# Patient Record
Sex: MALE | Race: WHITE
[De-identification: names, ages, dates, MRNs, and addresses within clinical notes are randomized per-mention and may not be internally consistent; named-entity substitution may affect disease eponyms.]

---

## 2020-12-20 ENCOUNTER — HOSPITAL ENCOUNTER (INPATIENT)
Dept: HOSPITAL 7 - FB.ED | Age: 70
LOS: 3 days | Discharge: HOME | DRG: 195 | End: 2020-12-23
Attending: FAMILY MEDICINE | Admitting: FAMILY MEDICINE
Payer: OTHER GOVERNMENT

## 2020-12-20 DIAGNOSIS — Z79.899: ICD-10-CM

## 2020-12-20 DIAGNOSIS — Z87.891: ICD-10-CM

## 2020-12-20 DIAGNOSIS — Z91.048: ICD-10-CM

## 2020-12-20 DIAGNOSIS — R10.13: ICD-10-CM

## 2020-12-20 DIAGNOSIS — J18.9: Primary | ICD-10-CM

## 2020-12-20 DIAGNOSIS — N28.9: ICD-10-CM

## 2020-12-20 DIAGNOSIS — Z20.828: ICD-10-CM

## 2020-12-20 DIAGNOSIS — R79.82: ICD-10-CM

## 2020-12-20 DIAGNOSIS — E86.0: ICD-10-CM

## 2020-12-20 DIAGNOSIS — E87.6: ICD-10-CM

## 2020-12-20 DIAGNOSIS — F10.10: ICD-10-CM

## 2020-12-20 DIAGNOSIS — K58.0: ICD-10-CM

## 2020-12-20 DIAGNOSIS — R51.9: ICD-10-CM

## 2020-12-20 DIAGNOSIS — E88.09: ICD-10-CM

## 2020-12-20 DIAGNOSIS — N17.9: ICD-10-CM

## 2020-12-20 DIAGNOSIS — Z91.09: ICD-10-CM

## 2020-12-20 DIAGNOSIS — I10: ICD-10-CM

## 2020-12-20 PROCEDURE — 99285 EMERGENCY DEPT VISIT HI MDM: CPT

## 2020-12-20 PROCEDURE — C9113 INJ PANTOPRAZOLE SODIUM, VIA: HCPCS

## 2020-12-20 PROCEDURE — U0002 COVID-19 LAB TEST NON-CDC: HCPCS

## 2020-12-20 PROCEDURE — 83605 ASSAY OF LACTIC ACID: CPT

## 2020-12-20 PROCEDURE — 96374 THER/PROPH/DIAG INJ IV PUSH: CPT

## 2020-12-20 PROCEDURE — 83735 ASSAY OF MAGNESIUM: CPT

## 2020-12-20 PROCEDURE — 96375 TX/PRO/DX INJ NEW DRUG ADDON: CPT

## 2020-12-20 PROCEDURE — 36415 COLL VENOUS BLD VENIPUNCTURE: CPT

## 2020-12-20 PROCEDURE — 87635 SARS-COV-2 COVID-19 AMP PRB: CPT

## 2020-12-20 PROCEDURE — 81001 URINALYSIS AUTO W/SCOPE: CPT

## 2020-12-20 PROCEDURE — 74177 CT ABD & PELVIS W/CONTRAST: CPT

## 2020-12-20 PROCEDURE — 84484 ASSAY OF TROPONIN QUANT: CPT

## 2020-12-20 PROCEDURE — 93005 ELECTROCARDIOGRAM TRACING: CPT

## 2020-12-20 PROCEDURE — 71045 X-RAY EXAM CHEST 1 VIEW: CPT

## 2020-12-20 PROCEDURE — 85025 COMPLETE CBC W/AUTO DIFF WBC: CPT

## 2020-12-20 PROCEDURE — 80053 COMPREHEN METABOLIC PANEL: CPT

## 2020-12-20 PROCEDURE — 86140 C-REACTIVE PROTEIN: CPT

## 2020-12-20 PROCEDURE — 83690 ASSAY OF LIPASE: CPT

## 2020-12-20 RX ADMIN — DEXTROSE MONOHYDRATE, SODIUM CHLORIDE, AND POTASSIUM CHLORIDE SCH MLS/HR: 50; 4.5; 1.49 INJECTION, SOLUTION INTRAVENOUS at 22:14

## 2020-12-20 RX ADMIN — VITAMIN D, TAB 1000IU (100/BT) SCH MCG: 25 TAB at 10:02

## 2020-12-20 RX ADMIN — POTASSIUM CHLORIDE SCH MEQ: 1500 TABLET, EXTENDED RELEASE ORAL at 14:53

## 2020-12-20 RX ADMIN — POTASSIUM CHLORIDE SCH MEQ: 1500 TABLET, EXTENDED RELEASE ORAL at 10:03

## 2020-12-20 RX ADMIN — DEXTROSE MONOHYDRATE, SODIUM CHLORIDE, AND POTASSIUM CHLORIDE SCH MLS/HR: 50; 4.5; 1.49 INJECTION, SOLUTION INTRAVENOUS at 07:47

## 2020-12-20 RX ADMIN — POTASSIUM CHLORIDE SCH MEQ: 1500 TABLET, EXTENDED RELEASE ORAL at 06:56

## 2020-12-20 RX ADMIN — LEVOFLOXACIN ONE MLS/HR: 750 INJECTION, SOLUTION INTRAVENOUS at 05:53

## 2020-12-20 RX ADMIN — LEVOFLOXACIN ONE MLS/HR: 750 INJECTION, SOLUTION INTRAVENOUS at 05:57

## 2020-12-20 NOTE — PCM.HP.2
H&P History of Present Illness





- General


Date of Service: 12/20/20


Admit Problem/Dx: 


                           Admission Diagnosis/Problem





Admission Diagnosis/Problem      Pneumonia








Source of Information: Patient


History Limitations: Reports: No Limitations





- History of Present Illness


Initial Comments - Free Text/Narative: 


Norberto is a 70-year-old male who came in because of epigastric pain for a week. 

Moderate to severe burning pain that does not seem to improve within. Associated

with loose stools but no vomiting. He has a history of PUD, with an upper and 

lower GI endoscopy 5 years ago. He also admits to alcohol abuse.





- Related Data


Allergies/Adverse Reactions: 


                                    Allergies











Allergy/AdvReac Type Severity Reaction Status Date / Time


 


plastics Allergy  Rash Uncoded 12/20/20 03:14











Home Medications: 


                                    Home Meds





Cholecalciferol (Vitamin D3) [Vitamin D3] 50 mcg PO DAILY 12/20/20 [History]


Gabapentin [Neurontin] 400 mg PO BID 12/20/20 [History]


Losartan [Cozaar] 50 mg PO DAILY 12/20/20 [History]


Naproxen 500 mg PO BID PRN 12/20/20 [History]


Pantoprazole Sodium [Protonix] 40 mg PO DAILY 12/20/20 [History]











Past Medical History


Cardiovascular History: Reports: Hypertension


Gastrointestinal History: Reports: Irritable Bowel Syndrome, Other (See Below)


Other Gastrointestinal History: History of ulcers.


Psychiatric History: Reports: Other (See Below)


Other Psychiatric History: Alcoholic





Social & Family History





- Tobacco Use


Tobacco Use Status *Q: Never Tobacco User


Second Hand Smoke Exposure: No





- Caffeine Use


Caffeine Use: Reports: Coffee


Other Caffeine Use: coffee 2x a week





- Alcohol Use


Days Per Week of Alcohol Use: 2


Number of Drinks Per Day: 1


Total Drinks Per Week: 2


Date of Last Drink: 12/19/20





- Recreational Drug Use


Recreational Drug Use: No





H&P Review of Systems





- Review of Systems:


Review Of Systems: Comprehensive ROS is negative, except as noted in HPI.





Exam





- Exam


Exam: See Below





- Vital Signs


Vital Signs: 


                                Last Vital Signs











Temp  100.1 F   12/20/20 06:07


 


Pulse  65   12/20/20 06:07


 


Resp  18   12/20/20 06:07


 


BP  115/67   12/20/20 06:07


 


Pulse Ox  94 L  12/20/20 06:07











Weight: 87.77 kg





- Exam


Quality Assessment: No: Supplemental Oxygen


General: Alert, Oriented


HEENT: PERRLA


Neck: Supple


Lungs: Clear to Auscultation, Crackles


Cardiovascular: Regular Rate


GI/Abdominal Exam: Normal Bowel Sounds, Soft


 (Male) Exam: Deferred


Rectal (Males) Exam: Deferred


Back Exam: Normal Inspection


Extremities: Normal Inspection


Skin: Warm


Neurological: Cranial Nerves Intact


Neuro Extensive - Mental Status: Alert


Neuro Extensive - Motor, Sensory, Reflexes: CN II-XII Intact


Psychiatric: Alert, Normal Affect





- Patient Data


Lab Results Last 24 hrs: 


                         Laboratory Results - last 24 hr











  12/20/20 12/20/20 12/20/20 Range/Units





  01:55 01:55 01:55 


 


WBC  10.0    (3.2-10.1)  x10-3/uL


 


RBC  5.91 H    (3.90-5.90)  x10(6)uL


 


Hgb  16.9    (12.9-17.7)  g/dL


 


Hct  50.5 H    (38.3-50.1)  %


 


MCV  85.6    (80.8-98.7)  fL


 


MCH  28.6    (27.0-33.3)  pg


 


MCHC  33.4    (28.7-35.3)  g/dL


 


RDW  13.1    (12.4-15.0)  %


 


Plt Count  195    (117-477)  x10(3)uL


 


MPV  8.6    (6.7-11.0)  fL


 


Add Manual Diff  Yes    


 


Neutrophils % (Manual)  76    (46-82)  %


 


Band Neutrophils %  7 H    (0-6)  %


 


Lymphocytes % (Manual)  11 L    (13-37)  %


 


Monocytes % (Manual)  6    (4-12)  %


 


Sodium   139   (135-145)  mmol/L


 


Potassium   2.9 L   (3.5-5.3)  mmol/L


 


Chloride   101   (100-110)  mmol/L


 


Carbon Dioxide   19 L   (21-32)  mmol/L


 


BUN   30 H   (7-18)  mg/dL


 


Creatinine   1.5 H   (0.70-1.30)  mg/dL


 


Est Cr Clr Drug Dosing   TNP   


 


Estimated GFR (MDRD)   46 L   (>60)  


 


BUN/Creatinine Ratio   20.0   (9-20)  


 


Glucose   140 H   ()  mg/dL


 


Lactic Acid     (0.4-2.0)  mmol/L


 


Calcium   9.4   (8.6-10.2)  mg/dL


 


Magnesium   2.0   (1.8-2.5)  mg/dL


 


Total Bilirubin   0.8   (0.1-1.3)  mg/dL


 


AST   31 H   (5-25)  IU/L


 


ALT   19   (12-36)  U/L


 


Alkaline Phosphatase   61   ()  IU/L


 


Troponin I    6.8  (4.0-60.3)  pg/mL


 


C-Reactive Protein    14.4 H*  (0.5-0.9)  mg/dL


 


Total Protein   7.9   (6.0-8.0)  g/dL


 


Albumin   3.1 L   (3.2-4.6)  g/dL


 


Globulin   4.8   g/dL


 


Albumin/Globulin Ratio   0.7   


 


Lipase     ()  U/L


 


Urine Color     (YELLOW)  


 


Urine Appearance     (CLEAR)  


 


Urine pH     (5.0-6.5)  


 


Ur Specific Gravity     (1.010-1.025)  


 


Urine Protein     (NEGATIVE)  mg/dL


 


Urine Glucose (UA)     (NORMAL)  mg/dL


 


Urine Ketones     (NEGATIVE)  mg/dL


 


Urine Occult Blood     (NEGATIVE)  


 


Urine Nitrite     (NEGATIVE)  


 


Urine Bilirubin     (NEGATIVE)  


 


Urine Urobilinogen     (NEGATIVE)  mg/dL


 


Ur Leukocyte Esterase     (NEGATIVE)  


 


Urine RBC     (0-5)  


 


Urine WBC     (0-5)  


 


Ur Squamous Epith Cells     (NS,R,O)  


 


Urine Bacteria     (NS)  


 


Coarse Granular Casts     (NS)  


 


SARS-CoV-2 RNA ([facility]O)     (NEGATIVE)  














  12/20/20 12/20/20 12/20/20 Range/Units





  01:55 01:55 03:20 


 


WBC     (3.2-10.1)  x10-3/uL


 


RBC     (3.90-5.90)  x10(6)uL


 


Hgb     (12.9-17.7)  g/dL


 


Hct     (38.3-50.1)  %


 


MCV     (80.8-98.7)  fL


 


MCH     (27.0-33.3)  pg


 


MCHC     (28.7-35.3)  g/dL


 


RDW     (12.4-15.0)  %


 


Plt Count     (117-477)  x10(3)uL


 


MPV     (6.7-11.0)  fL


 


Add Manual Diff     


 


Neutrophils % (Manual)     (46-82)  %


 


Band Neutrophils %     (0-6)  %


 


Lymphocytes % (Manual)     (13-37)  %


 


Monocytes % (Manual)     (4-12)  %


 


Sodium     (135-145)  mmol/L


 


Potassium     (3.5-5.3)  mmol/L


 


Chloride     (100-110)  mmol/L


 


Carbon Dioxide     (21-32)  mmol/L


 


BUN     (7-18)  mg/dL


 


Creatinine     (0.70-1.30)  mg/dL


 


Est Cr Clr Drug Dosing     


 


Estimated GFR (MDRD)     (>60)  


 


BUN/Creatinine Ratio     (9-20)  


 


Glucose     ()  mg/dL


 


Lactic Acid  1.7    (0.4-2.0)  mmol/L


 


Calcium     (8.6-10.2)  mg/dL


 


Magnesium     (1.8-2.5)  mg/dL


 


Total Bilirubin     (0.1-1.3)  mg/dL


 


AST     (5-25)  IU/L


 


ALT     (12-36)  U/L


 


Alkaline Phosphatase     ()  IU/L


 


Troponin I     (4.0-60.3)  pg/mL


 


C-Reactive Protein     (0.5-0.9)  mg/dL


 


Total Protein     (6.0-8.0)  g/dL


 


Albumin     (3.2-4.6)  g/dL


 


Globulin     g/dL


 


Albumin/Globulin Ratio     


 


Lipase   72 L   ()  U/L


 


Urine Color     (YELLOW)  


 


Urine Appearance     (CLEAR)  


 


Urine pH     (5.0-6.5)  


 


Ur Specific Gravity     (1.010-1.025)  


 


Urine Protein     (NEGATIVE)  mg/dL


 


Urine Glucose (UA)     (NORMAL)  mg/dL


 


Urine Ketones     (NEGATIVE)  mg/dL


 


Urine Occult Blood     (NEGATIVE)  


 


Urine Nitrite     (NEGATIVE)  


 


Urine Bilirubin     (NEGATIVE)  


 


Urine Urobilinogen     (NEGATIVE)  mg/dL


 


Ur Leukocyte Esterase     (NEGATIVE)  


 


Urine RBC     (0-5)  


 


Urine WBC     (0-5)  


 


Ur Squamous Epith Cells     (NS,R,O)  


 


Urine Bacteria     (NS)  


 


Coarse Granular Casts     (NS)  


 


SARS-CoV-2 RNA ([place]O)    Negative  (NEGATIVE)  














  12/20/20 Range/Units





  04:50 


 


WBC   (3.2-10.1)  x10-3/uL


 


RBC   (3.90-5.90)  x10(6)uL


 


Hgb   (12.9-17.7)  g/dL


 


Hct   (38.3-50.1)  %


 


MCV   (80.8-98.7)  fL


 


MCH   (27.0-33.3)  pg


 


MCHC   (28.7-35.3)  g/dL


 


RDW   (12.4-15.0)  %


 


Plt Count   (117-477)  x10(3)uL


 


MPV   (6.7-11.0)  fL


 


Add Manual Diff   


 


Neutrophils % (Manual)   (46-82)  %


 


Band Neutrophils %   (0-6)  %


 


Lymphocytes % (Manual)   (13-37)  %


 


Monocytes % (Manual)   (4-12)  %


 


Sodium   (135-145)  mmol/L


 


Potassium   (3.5-5.3)  mmol/L


 


Chloride   (100-110)  mmol/L


 


Carbon Dioxide   (21-32)  mmol/L


 


BUN   (7-18)  mg/dL


 


Creatinine   (0.70-1.30)  mg/dL


 


Est Cr Clr Drug Dosing   


 


Estimated GFR (MDRD)   (>60)  


 


BUN/Creatinine Ratio   (9-20)  


 


Glucose   ()  mg/dL


 


Lactic Acid   (0.4-2.0)  mmol/L


 


Calcium   (8.6-10.2)  mg/dL


 


Magnesium   (1.8-2.5)  mg/dL


 


Total Bilirubin   (0.1-1.3)  mg/dL


 


AST   (5-25)  IU/L


 


ALT   (12-36)  U/L


 


Alkaline Phosphatase   ()  IU/L


 


Troponin I   (4.0-60.3)  pg/mL


 


C-Reactive Protein   (0.5-0.9)  mg/dL


 


Total Protein   (6.0-8.0)  g/dL


 


Albumin   (3.2-4.6)  g/dL


 


Globulin   g/dL


 


Albumin/Globulin Ratio   


 


Lipase   ()  U/L


 


Urine Color  Yellow  (YELLOW)  


 


Urine Appearance  Slightly cloudy  (CLEAR)  


 


Urine pH  6.0  (5.0-6.5)  


 


Ur Specific Gravity  1.020  (1.010-1.025)  


 


Urine Protein  Trace  (NEGATIVE)  mg/dL


 


Urine Glucose (UA)  Normal  (NORMAL)  mg/dL


 


Urine Ketones  Negative  (NEGATIVE)  mg/dL


 


Urine Occult Blood  Moderate H  (NEGATIVE)  


 


Urine Nitrite  Negative  (NEGATIVE)  


 


Urine Bilirubin  Negative  (NEGATIVE)  


 


Urine Urobilinogen  Normal  (NEGATIVE)  mg/dL


 


Ur Leukocyte Esterase  Negative  (NEGATIVE)  


 


Urine RBC  5-10 H  (0-5)  


 


Urine WBC  0-5  (0-5)  


 


Ur Squamous Epith Cells  Occasional  (NS,R,O)  


 


Urine Bacteria  Few H  (NS)  


 


Coarse Granular Casts  Rare H  (NS)  


 


SARS-CoV-2 RNA ([facility]INCITO)   (NEGATIVE)  











Result Diagrams: 


                                 12/20/20 01:55





                                 12/20/20 01:55


  ** #1 Interpretation


Rhythm: NSR





Sepsis Event Note





- Evaluation


Sepsis Screening Result: No Definite Risk





- Focused Exam


Vital Signs: 


                                   Vital Signs











  Temp Pulse Resp BP Pulse Ox


 


 12/20/20 06:07  100.1 F  65  18  115/67  94 L


 


 12/20/20 05:35  100 F  63  18  150/88 H  95


 


 12/20/20 01:10  99.2 F  75  18  155/97 H  96














- Problem List


(1) Pneumonia


SNOMED Code(s): 217899217


   ICD Code: J18.9 - PNEUMONIA, UNSPECIFIED ORGANISM   Status: Acute   Current 

Visit: Yes   


Qualifiers: 


   Lung location: unspecified part of lung 





(2) Hypokalemia


SNOMED Code(s): 80793610


   ICD Code: E87.6 - HYPOKALEMIA   Status: Acute   Current Visit: Yes   





(3) Epigastric pain determined by examination


SNOMED Code(s): 064916038


   ICD Code: R10.13 - EPIGASTRIC PAIN   Status: Acute   Current Visit: Yes   





(4) Acute renal insufficiency


SNOMED Code(s): 195284758


   ICD Code: N28.9 - DISORDER OF KIDNEY AND URETER, UNSPECIFIED   Status: Acute 

  Current Visit: Yes   





(5) IBS (irritable bowel syndrome)


SNOMED Code(s): 04600169


   ICD Code: K58.9 - IRRITABLE BOWEL SYNDROME WITHOUT DIARRHEA   Status: Acute  

 Current Visit: Yes   


Qualifiers: 


   Irritable bowel syndrome type: with diarrhea   Qualified Code(s): K58.0 - 

Irritable bowel syndrome with diarrhea   


Problem List Initiated/Reviewed/Updated: Yes


Orders Last 24hrs: 


                               Active Orders 24 hr











 Category Date Time Status


 


 Admission Status [Patient Status] [ADT] Routine ADT  12/20/20 05:28 Active


 


 Cardiac Monitoring [RC] CONTINUOUS Care  12/20/20 05:47 Active


 


 EKG Documentation Completion [RC] ASDIRECTED Care  12/20/20 01:30 Active


 


 Oxygen Therapy [RC] PRN Care  12/20/20 05:47 Active


 


 Up ad Delilah [RC] ASDIRECTED Care  12/20/20 05:47 Active


 


 VTE/DVT Education [RC] Per Unit Routine Care  12/20/20 05:47 Active


 


 Vital Signs [RC] Q4H Care  12/20/20 05:47 Active


 


 Regular Diet [DIET] Diet  12/20/20 Breakfast Active


 


 Abdomen Pelvis w Cont [CT] Stat Exams  12/20/20 01:28 Taken


 


 Chest 1V Frontal [CR] Stat Exams  12/20/20 01:28 Taken


 


 Chest 2V [CR] Timed Exams  12/22/20 08:00 Ordered


 


 CBC WITH AUTO DIFF [HEME] DAILY Lab  12/21/20 06:00 Ordered


 


 CBC WITH AUTO DIFF [HEME] DAILY Lab  12/22/20 06:00 Ordered


 


 CBC WITH AUTO DIFF [HEME] DAILY Lab  12/23/20 06:00 Ordered


 


 CBC WITH AUTO DIFF [HEME] DAILY Lab  12/24/20 06:00 Ordered


 


 CBC WITH AUTO DIFF [HEME] DAILY Lab  12/25/20 06:00 Ordered


 


 COMPREHENSIVE METABOLIC PN,CMP [CHEM] DAILY Lab  12/21/20 06:00 Ordered


 


 COMPREHENSIVE METABOLIC PN,CMP [CHEM] DAILY Lab  12/22/20 06:00 Ordered


 


 COMPREHENSIVE METABOLIC PN,CMP [CHEM] DAILY Lab  12/23/20 06:00 Ordered


 


 COMPREHENSIVE METABOLIC PN,CMP [CHEM] DAILY Lab  12/24/20 06:00 Ordered


 


 COMPREHENSIVE METABOLIC PN,CMP [CHEM] DAILY Lab  12/25/20 06:00 Ordered


 


 CORONAVIRUS COVID-19 [place]CITO [MOLEC] Routine Lab  12/21/20 06:00 Ordered


 


 CULTURE BLOOD [BC] Urgent Lab  12/20/20 05:50 Received


 


 CULTURE BLOOD [BC] Urgent Lab  12/20/20 05:55 Received


 


 Cholecalciferol (Vitamin D3) [Vitamin D3] Med  12/20/20 09:00 Active





 50 mcg PO DAILY   


 


 D5 1/2 NS w/ 20 mEq/L KCl 1,000 ml Med  12/20/20 06:00 Active





 IV ASDIRECTED   


 


 Enoxaparin [Lovenox] Med  12/20/20 06:00 Active





 40 mg SUBCUT Q24H   


 


 Gabapentin [Neurontin] Med  12/20/20 09:00 Active





 400 mg PO BID   


 


 Levofloxacin/Dextrose 5%-Water [Levaquin in D5W 750 MG/ Med  12/20/20 09:15 

Ordered





 150 ML] 750 mg   





 Premix Bag 1 bag   





 IV Q24H   


 


 Losartan [Cozaar] Med  12/20/20 09:00 Active





 50 mg PO DAILY   


 


 Ondansetron [Zofran] Med  12/20/20 05:47 Active





 4 mg IV Q4H PRN   


 


 Pantoprazole [ProTONIX***] Med  12/20/20 09:00 Active





 40 mg PO DAILY   


 


 Potassium Chloride [Klor-Con M20] Med  12/20/20 06:00 Active





 40 meq PO Q4H   


 


 Sodium Chloride 0.9% [Normal Saline] 1,000 ml Med  12/20/20 03:00 Active





 IV ASDIRECTED   


 


 Zolpidem [Ambien] Med  12/20/20 05:47 Active





 5 mg PO BEDTIME PRN   


 


 Blood Culture x2 Reflex Set [OM.PC] Urgent Oth  12/20/20 05:28 Ordered


 


 Resuscitation Status Routine Resus Stat  12/20/20 05:47 Ordered


 


 EKG 12 Lead [EK] Routine Ther  12/20/20 01:28 Ordered








                                Medication Orders





Cholecalciferol (Vitamin D3)  50 mcg PO DAILY [facility]


Enoxaparin Sodium (Lovenox)  40 mg SUBCUT Q24H [facility]


   Last Admin: 12/20/20 07:00  Dose: 40 mg


   Documented by: JOE


Gabapentin (Neurontin)  400 mg PO BID [facility]


Sodium Chloride (Normal Saline)  1,000 mls @ 999 mls/hr IV ASDIRECTED [facility]


   Last Admin: 12/20/20 02:49  Dose: 999 mls/hr


   Documented by: HARRY


Potassium Chloride/Dextrose/Sod Cl (D5 1/2 Ns W/ 20 Meq/L Kcl)  1,000 mls @ 75 

mls/hr IV ASDIRECTED [facility]


   Last Admin: 12/20/20 07:47  Dose: 75 mls/hr


   Documented by: REEMA


Levofloxacin/Dextrose 750 mg/ (Premix)  150 mls @ 100 mls/hr IV Q24H [facility]


Losartan Potassium (Cozaar)  50 mg PO DAILY [facility]


Ondansetron HCl (Zofran)  4 mg IV Q4H PRN


   PRN Reason: Nausea/Vomiting


Pantoprazole Sodium (Protonix***)  40 mg PO DAILY [facility]


Potassium Chloride (Klor-Con M20)  40 meq PO Q4H [facility]


   Stop: 12/20/20 14:01


   Last Admin: 12/20/20 06:56  Dose: 40 meq


   Documented by: JOE


Zolpidem Tartrate (Ambien)  5 mg PO BEDTIME PRN


   PRN Reason: Sleep








Assessment/Plan Comment:: 


Admit for IV fluid supplantation,IV antibiotics. Replace K. Repeat Labs. CIWA 

protocol to be initiated. PPI for abdominalgia.

## 2020-12-20 NOTE — EDM.PDOC
ED HPI GENERAL MEDICAL PROBLEM





- General


Chief Complaint: General


Stated Complaint: cough, nausea


Time Seen by Provider: 12/20/20 01:15


Source of Information: Reports: Patient


History Limitations: Reports: No Limitations





- History of Present Illness


INITIAL COMMENTS - FREE TEXT/NARRATIVE: 





c/o epigastric pain x 1w





pt drinks alcohol daily, beer, including yesterday





has had pain in his epigastrium x 1w, has h/o PUD ~5y ago with upper and lower 

endoscopy





no prior abd surgery





has had slight cough x 1w, no fever





has not eaten in 2d





has IBS with freq loose BMs





here with wife who provided most of hx





- Related Data


                                    Allergies











Allergy/AdvReac Type Severity Reaction Status Date / Time


 


plastics Allergy  Rash Uncoded 12/20/20 03:14











Home Meds: 


                                    Home Meds





Cholecalciferol (Vitamin D3) [Vitamin D3] 50 mcg PO DAILY 12/20/20 [History]


Gabapentin [Neurontin] 400 mg PO BID 12/20/20 [History]


Losartan [Cozaar] 50 mg PO DAILY 12/20/20 [History]


Naproxen 500 mg PO BID PRN 12/20/20 [History]


Pantoprazole Sodium [Protonix] 40 mg PO DAILY 12/20/20 [History]











ED ROS GENERAL





- Review of Systems


Review Of Systems: See Below


Constitutional: Reports: No Symptoms


HEENT: Reports: No Symptoms


Respiratory: Reports: Shortness of Breath, Cough


Cardiovascular: Reports: No Symptoms


Endocrine: Reports: No Symptoms


GI/Abdominal: Reports: Abdominal Pain, Diarrhea, Nausea, Vomiting


: Reports: No Symptoms


Musculoskeletal: Reports: No Symptoms


Skin: Reports: No Symptoms


Neurological: Reports: No Symptoms


Psychiatric: Reports: No Symptoms


Hematologic/Lymphatic: Reports: No Symptoms


Immunologic: Reports: No Symptoms





ED EXAM, GENERAL





- Physical Exam


Exam: See Below


General Appearance: Alert, WD/WN, Mild Distress, Other (lying covered with a 

blanket, did not want to answer questions (which his wife did))


Ears: Normal External Exam, Hearing Grossly Normal


Nose: Normal Inspection


Head: Atraumatic, Normocephalic


Neck: Normal Inspection, Supple, Non-Tender, Full Range of Motion.  No: 

Lymphadenopathy (R), Lymphadenopathy (L)


Respiratory/Chest: Lungs Clear, Normal Breath Sounds, No Accessory Muscle Use


Cardiovascular: Regular Rate, Rhythm, No Edema, No Gallop, Other (2/6 [place] at 

LSB)


GI/Abdominal: Soft, No Distention, Other (1+ epigastric and LUQ tender, possible

 tender at L flank, no guard/rebound).  No: Distended, Guarding, Rigid, Rebound


Back Exam: Normal Inspection, Full Range of Motion.  No: CVA Tenderness (R), CVA

 Tenderness (L)


Extremities: Normal Range of Motion, Other (moderate dec'd turgor UEs, no pretib

 edema)


Neurological: Alert, Oriented, CN II-XII Intact, No Motor/Sensory Deficits


Psychiatric: Anxious


Skin Exam: Warm, Dry, Intact, Normal Color, No Rash


Lymphatic: No Adenopathy





Course





- Vital Signs


Last Recorded V/S: 


                                Last Vital Signs











Temp  37.3 C   12/20/20 01:10


 


Pulse  75   12/20/20 01:10


 


Resp  18   12/20/20 01:10


 


BP  155/97 H  12/20/20 01:10


 


Pulse Ox  96   12/20/20 01:10














- Orders/Labs/Meds


Orders: 


                               Active Orders 24 hr











 Category Date Time Status


 


 Admission Status [Patient Status] [ADT] Routine ADT  12/20/20 05:28 Ordered


 


 EKG Documentation Completion [RC] ASDIRECTED Care  12/20/20 01:30 Active


 


 Abdomen Pelvis w Cont [CT] Stat Exams  12/20/20 01:28 Taken


 


 Chest 1V Frontal [CR] Stat Exams  12/20/20 01:28 Taken


 


 CULTURE BLOOD [BC] Urgent Lab  12/20/20 05:28 Ordered


 


 CULTURE BLOOD [BC] Urgent Lab  12/20/20 05:28 Ordered


 


 Levofloxacin/Dextrose 5%-Water [Levaquin in D5W 750 MG/ Med  12/20/20 05:31 

Ordered





 150 ML] 750 mg   





 Premix Bag 1 bag   





 IV ONETIME   


 


 Sodium Chloride 0.9% [Normal Saline] 1,000 ml Med  12/20/20 03:00 Active





 IV ASDIRECTED   


 


 Blood Culture x2 Reflex Set [OM.PC] Urgent Oth  12/20/20 05:28 Ordered


 


 EKG 12 Lead [EK] Routine Ther  12/20/20 01:28 Ordered








                                Medication Orders





Sodium Chloride (Normal Saline)  1,000 mls @ 999 mls/hr IV ASDIRECTED MARIEL


   Last Admin: 12/20/20 02:49  Dose: 999 mls/hr


   Documented by: BRENLOR


Levofloxacin/Dextrose 750 mg/ (Premix)  150 mls @ 100 mls/hr IV ONETIME ONE


   Stop: 12/20/20 07:00








Labs: 


                                Laboratory Tests











  12/20/20 12/20/20 12/20/20 Range/Units





  01:55 01:55 01:55 


 


WBC  10.0    (3.2-10.1)  x10-3/uL


 


RBC  5.91 H    (3.90-5.90)  x10(6)uL


 


Hgb  16.9    (12.9-17.7)  g/dL


 


Hct  50.5 H    (38.3-50.1)  %


 


MCV  85.6    (80.8-98.7)  fL


 


MCH  28.6    (27.0-33.3)  pg


 


MCHC  33.4    (28.7-35.3)  g/dL


 


RDW  13.1    (12.4-15.0)  %


 


Plt Count  195    (117-477)  x10(3)uL


 


MPV  8.6    (6.7-11.0)  fL


 


Add Manual Diff  Yes    


 


Neutrophils % (Manual)  76    (46-82)  %


 


Band Neutrophils %  7 H    (0-6)  %


 


Lymphocytes % (Manual)  11 L    (13-37)  %


 


Monocytes % (Manual)  6    (4-12)  %


 


Sodium   139   (135-145)  mmol/L


 


Potassium   2.9 L   (3.5-5.3)  mmol/L


 


Chloride   101   (100-110)  mmol/L


 


Carbon Dioxide   19 L   (21-32)  mmol/L


 


BUN   30 H   (7-18)  mg/dL


 


Creatinine   1.5 H   (0.70-1.30)  mg/dL


 


Est Cr Clr Drug Dosing   TNP   


 


Estimated GFR (MDRD)   46 L   (>60)  


 


BUN/Creatinine Ratio   20.0   (9-20)  


 


Glucose   140 H   ()  mg/dL


 


Lactic Acid     (0.4-2.0)  mmol/L


 


Calcium   9.4   (8.6-10.2)  mg/dL


 


Magnesium   2.0   (1.8-2.5)  mg/dL


 


Total Bilirubin   0.8   (0.1-1.3)  mg/dL


 


AST   31 H   (5-25)  IU/L


 


ALT   19   (12-36)  U/L


 


Alkaline Phosphatase   61   ()  IU/L


 


Troponin I    6.8  (4.0-60.3)  pg/mL


 


C-Reactive Protein    14.4 H*  (0.5-0.9)  mg/dL


 


Total Protein   7.9   (6.0-8.0)  g/dL


 


Albumin   3.1 L   (3.2-4.6)  g/dL


 


Globulin   4.8   g/dL


 


Albumin/Globulin Ratio   0.7   


 


Lipase     ()  U/L


 


Urine Color     (YELLOW)  


 


Urine Appearance     (CLEAR)  


 


Urine pH     (5.0-6.5)  


 


Ur Specific Gravity     (1.010-1.025)  


 


Urine Protein     (NEGATIVE)  mg/dL


 


Urine Glucose (UA)     (NORMAL)  mg/dL


 


Urine Ketones     (NEGATIVE)  mg/dL


 


Urine Occult Blood     (NEGATIVE)  


 


Urine Nitrite     (NEGATIVE)  


 


Urine Bilirubin     (NEGATIVE)  


 


Urine Urobilinogen     (NEGATIVE)  mg/dL


 


Ur Leukocyte Esterase     (NEGATIVE)  


 


Urine RBC     (0-5)  


 


Urine WBC     (0-5)  


 


Ur Squamous Epith Cells     (NS,R,O)  


 


Urine Bacteria     (NS)  


 


Coarse Granular Casts     (NS)  


 


SARS-CoV-2 RNA ([facility]APIN[facility]O)     (NEGATIVE)  














  12/20/20 12/20/20 12/20/20 Range/Units





  01:55 01:55 03:20 


 


WBC     (3.2-10.1)  x10-3/uL


 


RBC     (3.90-5.90)  x10(6)uL


 


Hgb     (12.9-17.7)  g/dL


 


Hct     (38.3-50.1)  %


 


MCV     (80.8-98.7)  fL


 


MCH     (27.0-33.3)  pg


 


MCHC     (28.7-35.3)  g/dL


 


RDW     (12.4-15.0)  %


 


Plt Count     (117-477)  x10(3)uL


 


MPV     (6.7-11.0)  fL


 


Add Manual Diff     


 


Neutrophils % (Manual)     (46-82)  %


 


Band Neutrophils %     (0-6)  %


 


Lymphocytes % (Manual)     (13-37)  %


 


Monocytes % (Manual)     (4-12)  %


 


Sodium     (135-145)  mmol/L


 


Potassium     (3.5-5.3)  mmol/L


 


Chloride     (100-110)  mmol/L


 


Carbon Dioxide     (21-32)  mmol/L


 


BUN     (7-18)  mg/dL


 


Creatinine     (0.70-1.30)  mg/dL


 


Est Cr Clr Drug Dosing     


 


Estimated GFR (MDRD)     (>60)  


 


BUN/Creatinine Ratio     (9-20)  


 


Glucose     ()  mg/dL


 


Lactic Acid  1.7    (0.4-2.0)  mmol/L


 


Calcium     (8.6-10.2)  mg/dL


 


Magnesium     (1.8-2.5)  mg/dL


 


Total Bilirubin     (0.1-1.3)  mg/dL


 


AST     (5-25)  IU/L


 


ALT     (12-36)  U/L


 


Alkaline Phosphatase     ()  IU/L


 


Troponin I     (4.0-60.3)  pg/mL


 


C-Reactive Protein     (0.5-0.9)  mg/dL


 


Total Protein     (6.0-8.0)  g/dL


 


Albumin     (3.2-4.6)  g/dL


 


Globulin     g/dL


 


Albumin/Globulin Ratio     


 


Lipase   72 L   ()  U/L


 


Urine Color     (YELLOW)  


 


Urine Appearance     (CLEAR)  


 


Urine pH     (5.0-6.5)  


 


Ur Specific Gravity     (1.010-1.025)  


 


Urine Protein     (NEGATIVE)  mg/dL


 


Urine Glucose (UA)     (NORMAL)  mg/dL


 


Urine Ketones     (NEGATIVE)  mg/dL


 


Urine Occult Blood     (NEGATIVE)  


 


Urine Nitrite     (NEGATIVE)  


 


Urine Bilirubin     (NEGATIVE)  


 


Urine Urobilinogen     (NEGATIVE)  mg/dL


 


Ur Leukocyte Esterase     (NEGATIVE)  


 


Urine RBC     (0-5)  


 


Urine WBC     (0-5)  


 


Ur Squamous Epith Cells     (NS,R,O)  


 


Urine Bacteria     (NS)  


 


Coarse Granular Casts     (NS)  


 


SARS-CoV-2 RNA ([place])    Negative  (NEGATIVE)  














  12/20/20 Range/Units





  04:50 


 


WBC   (3.2-10.1)  x10-3/uL


 


RBC   (3.90-5.90)  x10(6)uL


 


Hgb   (12.9-17.7)  g/dL


 


Hct   (38.3-50.1)  %


 


MCV   (80.8-98.7)  fL


 


MCH   (27.0-33.3)  pg


 


MCHC   (28.7-35.3)  g/dL


 


RDW   (12.4-15.0)  %


 


Plt Count   (117-477)  x10(3)uL


 


MPV   (6.7-11.0)  fL


 


Add Manual Diff   


 


Neutrophils % (Manual)   (46-82)  %


 


Band Neutrophils %   (0-6)  %


 


Lymphocytes % (Manual)   (13-37)  %


 


Monocytes % (Manual)   (4-12)  %


 


Sodium   (135-145)  mmol/L


 


Potassium   (3.5-5.3)  mmol/L


 


Chloride   (100-110)  mmol/L


 


Carbon Dioxide   (21-32)  mmol/L


 


BUN   (7-18)  mg/dL


 


Creatinine   (0.70-1.30)  mg/dL


 


Est Cr Clr Drug Dosing   


 


Estimated GFR (MDRD)   (>60)  


 


BUN/Creatinine Ratio   (9-20)  


 


Glucose   ()  mg/dL


 


Lactic Acid   (0.4-2.0)  mmol/L


 


Calcium   (8.6-10.2)  mg/dL


 


Magnesium   (1.8-2.5)  mg/dL


 


Total Bilirubin   (0.1-1.3)  mg/dL


 


AST   (5-25)  IU/L


 


ALT   (12-36)  U/L


 


Alkaline Phosphatase   ()  IU/L


 


Troponin I   (4.0-60.3)  pg/mL


 


C-Reactive Protein   (0.5-0.9)  mg/dL


 


Total Protein   (6.0-8.0)  g/dL


 


Albumin   (3.2-4.6)  g/dL


 


Globulin   g/dL


 


Albumin/Globulin Ratio   


 


Lipase   ()  U/L


 


Urine Color  Yellow  (YELLOW)  


 


Urine Appearance  Slightly cloudy  (CLEAR)  


 


Urine pH  6.0  (5.0-6.5)  


 


Ur Specific Gravity  1.020  (1.010-1.025)  


 


Urine Protein  Trace  (NEGATIVE)  mg/dL


 


Urine Glucose (UA)  Normal  (NORMAL)  mg/dL


 


Urine Ketones  Negative  (NEGATIVE)  mg/dL


 


Urine Occult Blood  Moderate H  (NEGATIVE)  


 


Urine Nitrite  Negative  (NEGATIVE)  


 


Urine Bilirubin  Negative  (NEGATIVE)  


 


Urine Urobilinogen  Normal  (NEGATIVE)  mg/dL


 


Ur Leukocyte Esterase  Negative  (NEGATIVE)  


 


Urine RBC  5-10 H  (0-5)  


 


Urine WBC  0-5  (0-5)  


 


Ur Squamous Epith Cells  Occasional  (NS,R,O)  


 


Urine Bacteria  Few H  (NS)  


 


Coarse Granular Casts  Rare H  (NS)  


 


SARS-CoV-2 RNA ([place]IN[place]O)   (NEGATIVE)  











Meds: 


Medications











Generic Name Dose Route Start Last Admin





  Trade Name Freq  PRN Reason Stop Dose Admin


 


Sodium Chloride  1,000 mls @ 999 mls/hr  12/20/20 03:00  12/20/20 02:49





  Normal Saline  IV   999 mls/hr





  ASDIRECTED MARIEL   Administration


 


Levofloxacin/Dextrose 750 mg/  150 mls @ 100 mls/hr  12/20/20 05:31 





  Premix  IV  12/20/20 07:00 





  ONETIME ONE  














Discontinued Medications














Generic Name Dose Route Start Last Admin





  Trade Name Rosalie  PRN Reason Stop Dose Admin


 


Acetaminophen  1,000 mg  12/20/20 05:34 





  Tylenol Extra Strength  PO  12/20/20 05:35 





  ONETIME ONE  


 


Fentanyl  50 mcg  12/20/20 01:27  12/20/20 02:04





  Sublimaze  IVPUSH  12/20/20 01:28  50 mcg





  ONETIME ONE   Administration


 


Sodium Chloride  1,000 mls @ 999 mls/hr  12/20/20 01:27  12/20/20 01:40





  Normal Saline  IV  12/20/20 02:27  999 mls/hr





  .BOLUS ONE   Administration


 


Iopamidol  100 ml  12/20/20 02:55  12/20/20 03:30





  Isovue-370 (76%)  IV  12/20/20 02:56  100 ml





  .AS DIRECTED ONE   Administration


 


Ondansetron HCl  4 mg  12/20/20 01:27  12/20/20 01:43





  Zofran  IVPUSH  12/20/20 01:28  4 mg





  ONETIME ONE   Administration














- Re-Assessments/Exams


Free Text/Narrative Re-Assessment/Exam: 





12/20/20 05:36


temp 37.3 on arrival, now inc'd to 100 F





pt CxR is suspicious for COVID (b/l, scattered infiltrates) as is his labs (very

 high CRP with normal WBC), he likely has a false neg COVID, will repeat 

tomorrow and admit to COVID unit





pt and wife in agreement





will cover with antbx as he has some bands present and may have a bacterial 

component, levo 750 mg IV given, pt mildly tremulous, appears mild to moderate 

ill altho not toxic





no cough or dyspnea in ED





abd/pelvis CT results reviewed with pt





no definite rales at L base, also c/w COVID








Departure





- Departure


Time of Disposition: 05:39


Disposition: Admitted As Inpatient 64


Condition: Good


Clinical Impression: 


 LLL pneumonia, RLL pneumonia, Elevated C-reactive protein (CRP), Acute renal 

insufficiency, Moderate dehydration, Hypoalbuminemia, Alcohol abuse, Former 

smoker, Hypokalemia








- Discharge Information


Referrals: 


PCP,None [Primary Care Provider] - 


Forms:  ED Department Discharge





Sepsis Event Note (ED)





- Focused Exam


Vital Signs: 


                                   Vital Signs











  Temp Pulse Resp BP Pulse Ox


 


 12/20/20 01:10  37.3 C  75  18  155/97 H  96














- My Orders


Last 24 Hours: 


My Active Orders





12/20/20 01:28


Abdomen Pelvis w Cont [CT] Stat 


Chest 1V Frontal [CR] Stat 


EKG 12 Lead [EK] Routine 





12/20/20 01:30


EKG Documentation Completion [RC] ASDIRECTED 





12/20/20 03:00


Sodium Chloride 0.9% [Normal Saline] 1,000 ml IV ASDIRECTED 





12/20/20 05:28


Admission Status [Patient Status] [ADT] Routine 


CULTURE BLOOD [BC] Urgent 


CULTURE BLOOD [BC] Urgent 


Blood Culture x2 Reflex Set [OM.PC] Urgent 





12/20/20 05:31


Levofloxacin/Dextrose 5%-Water [Levaquin in D5W 750 MG/150 ML] 750 mg   Premix 

Bag 1 bag IV ONETIME 














- Assessment/Plan


Last 24 Hours: 


My Active Orders





12/20/20 01:28


Abdomen Pelvis w Cont [CT] Stat 


Chest 1V Frontal [CR] Stat 


EKG 12 Lead [EK] Routine 





12/20/20 01:30


EKG Documentation Completion [RC] ASDIRECTED 





12/20/20 03:00


Sodium Chloride 0.9% [Normal Saline] 1,000 ml IV ASDIRECTED 





12/20/20 05:28


Admission Status [Patient Status] [ADT] Routine 


CULTURE BLOOD [BC] Urgent 


CULTURE BLOOD [BC] Urgent 


Blood Culture x2 Reflex Set [OM.PC] Urgent 





12/20/20 05:31


Levofloxacin/Dextrose 5%-Water [Levaquin in D5W 750 MG/150 ML] 750 mg   Premix 

Bag 1 bag IV ONETIME

## 2020-12-21 RX ADMIN — DEXTROSE MONOHYDRATE, SODIUM CHLORIDE, AND POTASSIUM CHLORIDE SCH MLS/HR: 50; 4.5; 1.49 INJECTION, SOLUTION INTRAVENOUS at 13:52

## 2020-12-21 RX ADMIN — VITAMIN D, TAB 1000IU (100/BT) SCH MCG: 25 TAB at 09:14

## 2020-12-21 RX ADMIN — LEVOFLOXACIN SCH MLS/HR: 750 INJECTION, SOLUTION INTRAVENOUS at 05:34

## 2020-12-21 NOTE — CR
INDICATION:  Cough



CHEST, ONE VIEW:  Portable AP upright view of the chest was obtained 12/20/20 
and compared with CT scan of the same date at 0332 hours.  Infiltration is noted
in the right mid lung field and in the left mid lung field and left lung base.  
The appearance is compatible with COVID-19 and should be correlated clinically. 




The heart did not appear enlarged.  



The aorta is somewhat tortuous.  Degenerative changes noted in the spine. 



IMPRESSION:  Bilateral pneumonia which is suspicious for COVID-19 - correlate 
clinically.  



Report was called to Dr. Woodard at approximately 1000 hours. 



[facility]

## 2020-12-21 NOTE — PCM.PN
- General Info


Date of Service: 12/21/20


Subjective Update: 


Complains of a headache. Weak. Cough. Still running low grade fever.





- Review of Systems


General: Reports: Fever, Weakness


Pulmonary: Reports: No Symptoms


Cardiovascular: Reports: No Symptoms


Gastrointestinal: Reports: No Symptoms


Genitourinary: Reports: No Symptoms


Musculoskeletal: Reports: No Symptoms


Skin: Reports: No Symptoms


Neurological: Reports: Headache





- Patient Data


Vitals - Most Recent: 


                                Last Vital Signs











Temp  96.1 F L  12/21/20 08:00


 


Pulse  55 L  12/21/20 08:00


 


Resp  18   12/21/20 08:00


 


BP  150/87 H  12/21/20 09:14


 


Pulse Ox  97   12/21/20 08:00











Weight - Most Recent: 87.77 kg


I&O - Last 24 Hours: 


                                 Intake & Output











 12/20/20 12/21/20 12/21/20





 22:59 06:59 14:59


 


Intake Total 480  


 


Balance 480  











Lab Results Last 24 Hours: 


                         Laboratory Results - last 24 hr











  12/21/20 12/21/20 12/21/20 Range/Units





  05:45 07:05 07:05 


 


WBC   7.7   (3.2-10.1)  x10-3/uL


 


RBC   5.11   (3.90-5.90)  x10(6)uL


 


Hgb   14.6   (12.9-17.7)  g/dL


 


Hct   44.2   (38.3-50.1)  %


 


MCV   86.6   (80.8-98.7)  fL


 


MCH   28.5   (27.0-33.3)  pg


 


MCHC   32.9   (28.7-35.3)  g/dL


 


RDW   13.4   (12.4-15.0)  %


 


Plt Count   182   (117-477)  x10(3)uL


 


MPV   8.4   (6.7-11.0)  fL


 


Add Manual Diff   Yes   


 


Neutrophils % (Manual)   90 H   (46-82)  %


 


Band Neutrophils %   4   (0-6)  %


 


Lymphocytes % (Manual)   4 L   (13-37)  %


 


Monocytes % (Manual)   2 L   (4-12)  %


 


Sodium    132 L  (135-145)  mmol/L


 


Potassium    3.5  (3.5-5.3)  mmol/L


 


Chloride    101  (100-110)  mmol/L


 


Carbon Dioxide    22  (21-32)  mmol/L


 


BUN    18  D  (7-18)  mg/dL


 


Creatinine    1.3  (0.70-1.30)  mg/dL


 


Est Cr Clr Drug Dosing    59.72  mL/min


 


Estimated GFR (MDRD)    54 L  (>60)  


 


BUN/Creatinine Ratio    13.8  (9-20)  


 


Glucose    137 H  ()  mg/dL


 


Calcium    8.8  (8.6-10.2)  mg/dL


 


Total Bilirubin    0.6  (0.1-1.3)  mg/dL


 


AST    26 H D  (5-25)  IU/L


 


ALT    21  D  (12-36)  U/L


 


Alkaline Phosphatase    50 L  ()  IU/L


 


Total Protein    6.1  (6.0-8.0)  g/dL


 


Albumin    2.2 L  (3.2-4.6)  g/dL


 


Globulin    3.9  g/dL


 


Albumin/Globulin Ratio    0.6  


 


SARS-CoV-2 RNA ([place])  Negative    (NEGATIVE)  











Hitesh Results Last 24 Hours: 


                                  Microbiology











 12/20/20 05:50 Aerobic Blood Culture - Preliminary





 Blood - Venous    NO GROWTH AFTER 1 DAY





 Anaerobic Blood Culture - Preliminary





    NO GROWTH AFTER 1 DAY


 


 12/20/20 05:55 Aerobic Blood Culture - Preliminary





 Blood - Venous - Lab Draw    NO GROWTH AFTER 1 DAY





 Anaerobic Blood Culture - Preliminary





    NO GROWTH AFTER 1 DAY











Med Orders - Current: 


                               Current Medications





Acetaminophen (Tylenol)  650 mg PO Q4H PRN


   PRN Reason: Fever


   Last Admin: 12/21/20 00:01 Dose:  650 mg


   Documented by: 


Cholecalciferol (Vitamin D3)  50 mcg PO DAILY [facility]


   Last Admin: 12/21/20 09:14 Dose:  50 mcg


   Documented by: 


Enoxaparin Sodium (Lovenox)  40 mg SUBCUT Q24H [facility]


   Last Admin: 12/21/20 05:38 Dose:  40 mg


   Documented by: 


Gabapentin (Neurontin)  400 mg PO BID [facility]


   Last Admin: 12/21/20 09:18 Dose:  400 mg


   Documented by: 


Potassium Chloride/Dextrose/Sod Cl (D5 1/2 Ns W/ 20 Meq/L Kcl)  1,000 mls @ 75 

mls/hr IV ASDIRECTED [facility]


   Last Admin: 12/20/20 22:14 Dose:  75 mls/hr


   Documented by: 


Levofloxacin/Dextrose 750 mg/ (Premix)  150 mls @ 100 mls/hr IV Q24H [facility]


   Last Admin: 12/21/20 05:34 Dose:  100 mls/hr


   Documented by: 


Ketorolac Tromethamine (Toradol)  15 mg IVPUSH Q6H PRN


   PRN Reason: Breakthrough Pain


   Stop: 12/26/20 09:51


Losartan Potassium (Cozaar)  50 mg PO DAILY [facility]


   Last Admin: 12/21/20 09:14 Dose:  50 mg


   Documented by: 


Ondansetron HCl (Zofran)  4 mg IV Q4H PRN


   PRN Reason: Nausea/Vomiting


   Last Admin: 12/20/20 13:30 Dose:  4 mg


   Documented by: 


Pantoprazole Sodium (Protonix Iv***)  40 mg IVPUSH DAILY [facility]


   Last Admin: 12/21/20 09:13 Dose:  40 mg


   Documented by: 


Thiamine HCl (Vitamin B-1)  100 mg PO DAILY [facility]


Zolpidem Tartrate (Ambien)  5 mg PO BEDTIME PRN


   PRN Reason: Sleep





Discontinued Medications





Acetaminophen (Tylenol Extra Strength)  1,000 mg PO ONETIME ONE


   Stop: 12/20/20 05:35


   Last Admin: 12/20/20 05:44 Dose:  1,000 mg


   Documented by: 


Acetaminophen (Tylenol)  650 mg PO QID [facility]


Fentanyl (Sublimaze)  50 mcg IVPUSH ONETIME ONE


   Stop: 12/20/20 01:28


   Last Admin: 12/20/20 02:04 Dose:  50 mcg


   Documented by: 


Sodium Chloride (Normal Saline)  1,000 mls @ 999 mls/hr IV .BOLUS ONE


   Stop: 12/20/20 02:27


   Last Admin: 12/20/20 01:40 Dose:  999 mls/hr


   Documented by: 


Sodium Chloride (Normal Saline)  1,000 mls @ 999 mls/hr IV ASDIRECTED [facility]


   Last Admin: 12/20/20 02:49 Dose:  999 mls/hr


   Documented by: 


Levofloxacin/Dextrose 750 mg/ (Premix)  150 mls @ 100 mls/hr IV ONETIME ONE


   Stop: 12/20/20 07:00


   Last Admin: 12/20/20 05:57 Dose:  100 mls/hr


   Documented by: 


Levofloxacin/Dextrose 750 mg/ (Premix)  150 mls @ 100 mls/hr IV Q48H [facility]


   Last Admin: 12/20/20 17:20 Dose:  Not Given


   Documented by: 


Thiamine HCl 100 mg/ Sodium (Chloride)  101 mls @ 202 mls/hr IV DAILY [facility]


   Last Admin: 12/20/20 10:07 Dose:  202 mls/hr


   Documented by: 


Iopamidol (Isovue-370 (76%))  100 ml IV .AS DIRECTED ONE


   Stop: 12/20/20 02:56


   Last Admin: 12/20/20 03:30 Dose:  100 ml


   Documented by: 


Losartan Potassium (Cozaar)  50 mg PO DAILY [facility]


   Last Admin: 12/20/20 10:23 Dose:  Not Given


   Documented by: 


Ondansetron HCl (Zofran)  4 mg IVPUSH ONETIME ONE


   Stop: 12/20/20 01:28


   Last Admin: 12/20/20 01:43 Dose:  4 mg


   Documented by: 


Ondansetron HCl (Zofran)  4 mg IVPUSH ONETIME ONE


   Stop: 12/20/20 05:42


   Last Admin: 12/20/20 06:47 Dose:  4 mg


   Documented by: 


Pantoprazole Sodium (Protonix***)  40 mg PO DAILY [facility]


   Last Admin: 12/20/20 10:23 Dose:  Not Given


   Documented by: 


Potassium Chloride (Klor-Con M20)  40 meq PO Q4H [facility]


   Stop: 12/20/20 14:01


   Last Admin: 12/20/20 14:53 Dose:  40 meq


   Documented by: 











- Exam


General: Alert, Oriented


HEENT: Pupils Equal


Neck: Supple


Lungs: Rales


Cardiovascular: Regular Rate


Skin: Warm, Dry


Neurological: No New Focal Deficit





Sepsis Event Note





- Evaluation


Sepsis Screening Result: No Definite Risk





- Focused Exam


Vital Signs: 


                                   Vital Signs











  Temp Pulse Resp BP BP Pulse Ox


 


 12/21/20 09:14     150/87 H  


 


 12/21/20 08:00  96.1 F L  55 L  18   150/87 H  97


 


 12/21/20 05:30  96 F L  56 L  18   113/68  94 L


 


 12/21/20 00:00  100.6 F  73  18   124/72  93 L














- Problem List & Annotations


(1) Pneumonia


SNOMED Code(s): 539399960


   Code(s): J18.9 - PNEUMONIA, UNSPECIFIED ORGANISM   Status: Acute   Current 

Visit: Yes   


Qualifiers: 


   Pneumonia type: due to unspecified organism   Lung location: unspecified part

of lung 





(2) Hypokalemia


SNOMED Code(s): 99939362


   Code(s): E87.6 - HYPOKALEMIA   Status: Acute   Current Visit: Yes   





(3) Epigastric pain determined by examination


SNOMED Code(s): 567913192


   Code(s): R10.13 - EPIGASTRIC PAIN   Status: Acute   Current Visit: Yes   





(4) Acute renal insufficiency


SNOMED Code(s): 246891152


   Code(s): N28.9 - DISORDER OF KIDNEY AND URETER, UNSPECIFIED   Status: Acute  

Current Visit: Yes   





(5) IBS (irritable bowel syndrome)


SNOMED Code(s): 97705430


   Code(s): K58.9 - IRRITABLE BOWEL SYNDROME WITHOUT DIARRHEA   Status: Acute   

Current Visit: Yes   


Qualifiers: 


   Irritable bowel syndrome type: with diarrhea   Qualified Code(s): K58.0 - 

Irritable bowel syndrome with diarrhea   





(6) Headache


SNOMED Code(s): 27989648


   Code(s): R51.9 - HEADACHE, UNSPECIFIED   Status: Acute   Current Visit: Yes  




Qualifiers: 


   Intractability: not intractable 





- Problem List Review


Problem List Initiated/Reviewed/Updated: Yes





- My Orders


Last 24 Hours: 


My Active Orders





12/20/20 09:22


[facility] Assessment [RC] 00,04,08,12,16,20 





12/20/20 09:30


Pantoprazole [ProTONIX IV***]   40 mg IVPUSH DAILY 





12/20/20 12:45


Acetaminophen [TylenoL]   650 mg PO Q4H PRN 





12/21/20 06:00


Levofloxacin/Dextrose 5%-Water [Levaquin in D5W 750 MG/150 ML] 750 mg   Premix 

Bag 1 bag IV Q24H 





12/21/20 09:26


INFLUENZA A+B AG SCREEN [RM] Urgent 





12/21/20 09:27


Isolation [COMM] Routine 





12/21/20 09:51


Ketorolac [Toradol]   15 mg IVPUSH Q6H PRN 





12/21/20 09:52


OT Evaluation and Treatment [CONS] Routine 


PT Evaluation and Treatment [CONS] Routine 





12/21/20 10:00


Thiamine [Vitamin B-1]   100 mg PO DAILY 





12/22/20 05:11


BASIC METABOLIC PANEL,BMP [CHEM] AM 


CRP [C-REACTIVE PROTEIN] [CHEM] AM 














- Plan


Plan:: 


Abd pain is better. DC IV Fluids.Toradol for Headache. COVID test negative x 2.

## 2020-12-22 RX ADMIN — LEVOFLOXACIN SCH MLS/HR: 750 INJECTION, SOLUTION INTRAVENOUS at 07:33

## 2020-12-22 RX ADMIN — Medication PRN ML: at 08:00

## 2020-12-22 RX ADMIN — Medication PRN ML: at 10:10

## 2020-12-22 RX ADMIN — VITAMIN D, TAB 1000IU (100/BT) SCH MCG: 25 TAB at 09:54

## 2020-12-22 NOTE — PCM.PN
- General Info


Date of Service: 12/22/20


Subjective Update: 


Feels much better now. A little cough. No fever,headache is better





- Review of Systems


General: Reports: No Symptoms


HEENT: Reports: No Symptoms


Pulmonary: Reports: No Symptoms


Cardiovascular: Reports: No Symptoms


Gastrointestinal: Reports: No Symptoms


Genitourinary: Reports: No Symptoms


Musculoskeletal: Reports: No Symptoms


Skin: Reports: No Symptoms


Neurological: Reports: No Symptoms


Psychiatric: Reports: No Symptoms





- Patient Data


Vitals - Most Recent: 


                                Last Vital Signs











Temp  97.6 F   12/22/20 04:00


 


Pulse  59 L  12/22/20 04:00


 


Resp  15   12/22/20 04:00


 


BP  127/63   12/22/20 04:00


 


Pulse Ox  93 L  12/22/20 04:00











Weight - Most Recent: 87.77 kg


I&O - Last 24 Hours: 


                                 Intake & Output











 12/21/20 12/22/20 12/22/20





 22:59 06:59 14:59


 


Intake Total 947  


 


Balance 947  











Lab Results Last 24 Hours: 


                         Laboratory Results - last 24 hr











  12/22/20 12/22/20 12/22/20 Range/Units





  06:55 06:55 06:55 


 


WBC  5.0    (3.2-10.1)  x10-3/uL


 


RBC  5.43    (3.90-5.90)  x10(6)uL


 


Hgb  15.4    (12.9-17.7)  g/dL


 


Hct  46.7    (38.3-50.1)  %


 


MCV  86.0    (80.8-98.7)  fL


 


MCH  28.4    (27.0-33.3)  pg


 


MCHC  33.0    (28.7-35.3)  g/dL


 


RDW  13.5    (12.4-15.0)  %


 


Plt Count  235    (117-477)  x10(3)uL


 


MPV  8.3    (6.7-11.0)  fL


 


Neut % (Auto)  79.2 H    (40.3-71.8)  %


 


Lymph % (Auto)  7.3 L    (15.8-45.3)  %


 


Mono % (Auto)  11.3    (5.5-15.2)  %


 


Eos % (Auto)  1.9    (0.1-6.8)  %


 


Baso % (Auto)  0.3    (0.3-3.8)  %


 


Neut # (Auto)  4.0    (1.7-6.9)  x10-3/uL


 


Lymph # (Auto)  0.4 L    (0.5-4.5)  x10-3/uL


 


Mono # (Auto)  0.6    (0.0-1.2)  x10-3/uL


 


Eos # (Auto)  0.1    (0.0-0.6)  x10-3/uL


 


Baso # (Auto)  0.0    (0.0-0.3)  x10-3/uL


 


Sodium   138   (135-145)  mmol/L


 


Potassium   3.5   (3.5-5.3)  mmol/L


 


Chloride   104   (100-110)  mmol/L


 


Carbon Dioxide   24   (21-32)  mmol/L


 


BUN   14   (7-18)  mg/dL


 


Creatinine   1.2   (0.70-1.30)  mg/dL


 


Est Cr Clr Drug Dosing   64.69   mL/min


 


Estimated GFR (MDRD)   60   (>60)  


 


BUN/Creatinine Ratio   11.7   (9-20)  


 


Glucose   108   ()  mg/dL


 


Calcium   8.7   (8.6-10.2)  mg/dL


 


Total Bilirubin   0.4   (0.1-1.3)  mg/dL


 


AST   28 H   (5-25)  IU/L


 


ALT   22   (12-36)  U/L


 


Alkaline Phosphatase   50 L   ()  IU/L


 


C-Reactive Protein    5.9 H*  (0.5-0.9)  mg/dL


 


Total Protein   6.0   (6.0-8.0)  g/dL


 


Albumin   2.2 L   (3.2-4.6)  g/dL


 


Globulin   3.8   g/dL


 


Albumin/Globulin Ratio   0.6   











Hitesh Results Last 24 Hours: 


                                  Microbiology











 12/20/20 05:50 Aerobic Blood Culture - Preliminary





 Blood - Venous    NO GROWTH AFTER 2 DAYS





 Anaerobic Blood Culture - Preliminary





    NO GROWTH AFTER 2 DAYS


 


 12/20/20 05:55 Aerobic Blood Culture - Preliminary





 Blood - Venous - Lab Draw    NO GROWTH AFTER 2 DAYS





 Anaerobic Blood Culture - Preliminary





    NO GROWTH AFTER 2 DAYS


 


 12/21/20 14:26 Influenza Type A Antigen Screen - Final





 Nasal Aspirate, Unspecified    NEGATIVE INFLUENZA A VIRUS AG





    REFERENCE RANGE: NEGATIVE





 Influenza Type B Antigen Screen - Final





    NEGATIVE INFLUENZA B VIRUS AG





    REFERENCE RANGE: NEGATIVE











Med Orders - Current: 


                               Current Medications





Acetaminophen (Tylenol)  650 mg PO Q4H PRN


   PRN Reason: Fever


   Last Admin: 12/21/20 00:01 Dose:  650 mg


   Documented by: 


Cholecalciferol (Vitamin D3)  50 mcg PO DAILY [facility]


   Last Admin: 12/21/20 09:14 Dose:  50 mcg


   Documented by: 


Enoxaparin Sodium (Lovenox)  40 mg SUBCUT Q24H [facility]


   Last Admin: 12/22/20 07:32 Dose:  40 mg


   Documented by: 


Gabapentin (Neurontin)  400 mg PO BID [facility]


   Last Admin: 12/21/20 20:55 Dose:  400 mg


   Documented by: 


Ketorolac Tromethamine (Toradol)  15 mg IVPUSH Q6H PRN


   PRN Reason: Breakthrough Pain


   Stop: 12/26/20 09:56


   Last Admin: 12/21/20 10:10 Dose:  15 mg


   Documented by: 


Levofloxacin (Levaquin)  750 mg PO Q24H [facility]


Loperamide HCl (Imodium)  2 mg PO Q6H PRN


   PRN Reason: Diarrhea


   Last Admin: 12/21/20 16:46 Dose:  2 mg


   Documented by: 


Losartan Potassium (Cozaar)  50 mg PO DAILY [facility]


   Last Admin: 12/21/20 09:14 Dose:  50 mg


   Documented by: 


Ondansetron HCl (Zofran)  4 mg IV Q4H PRN


   PRN Reason: Nausea/Vomiting


   Last Admin: 12/20/20 13:30 Dose:  4 mg


   Documented by: 


Pantoprazole Sodium (Protonix Iv***)  40 mg IVPUSH DAILY [facility]


   Last Admin: 12/21/20 09:13 Dose:  40 mg


   Documented by: 


Thiamine HCl (Vitamin B-1)  100 mg PO DAILY [facility]


   Last Admin: 12/21/20 10:00 Dose:  100 mg


   Documented by: 


Zolpidem Tartrate (Ambien)  5 mg PO BEDTIME PRN


   PRN Reason: Sleep





Discontinued Medications





Acetaminophen (Tylenol Extra Strength)  1,000 mg PO ONETIME ONE


   Stop: 12/20/20 05:35


   Last Admin: 12/20/20 05:44 Dose:  1,000 mg


   Documented by: 


Acetaminophen (Tylenol)  650 mg PO QID [facility]


Fentanyl (Sublimaze)  50 mcg IVPUSH ONETIME ONE


   Stop: 12/20/20 01:28


   Last Admin: 12/20/20 02:04 Dose:  50 mcg


   Documented by: 


Sodium Chloride (Normal Saline)  1,000 mls @ 999 mls/hr IV .BOLUS ONE


   Stop: 12/20/20 02:27


   Last Admin: 12/20/20 01:40 Dose:  999 mls/hr


   Documented by: 


Sodium Chloride (Normal Saline)  1,000 mls @ 999 mls/hr IV ASDIRECTED [facility]


   Last Admin: 12/20/20 02:49 Dose:  999 mls/hr


   Documented by: 


Levofloxacin/Dextrose 750 mg/ (Premix)  150 mls @ 100 mls/hr IV ONETIME ONE


   Stop: 12/20/20 07:00


   Last Admin: 12/20/20 05:57 Dose:  100 mls/hr


   Documented by: 


Levofloxacin/Dextrose 750 mg/ (Premix)  150 mls @ 100 mls/hr IV Q48H [facility]


   Last Admin: 12/20/20 17:20 Dose:  Not Given


   Documented by: 


Potassium Chloride/Dextrose/Sod Cl (D5 1/2 Ns W/ 20 Meq/L Kcl)  1,000 mls @ 75 

mls/hr IV ASDIRECTED [facility]


   Last Admin: 12/21/20 13:52 Dose:  75 mls/hr


   Documented by: 


Levofloxacin/Dextrose 750 mg/ (Premix)  150 mls @ 100 mls/hr IV Q24H [facility]


   Last Admin: 12/22/20 07:33 Dose:  100 mls/hr


   Documented by: 


Thiamine HCl 100 mg/ Sodium (Chloride)  101 mls @ 202 mls/hr IV DAILY [facility]


   Last Admin: 12/21/20 10:03 Dose:  Not Given


   Documented by: 


Iopamidol (Isovue-370 (76%))  100 ml IV .AS DIRECTED ONE


   Stop: 12/20/20 02:56


   Last Admin: 12/20/20 03:30 Dose:  100 ml


   Documented by: 


Losartan Potassium (Cozaar)  50 mg PO DAILY [facility]


   Last Admin: 12/20/20 10:23 Dose:  Not Given


   Documented by: 


Ondansetron HCl (Zofran)  4 mg IVPUSH ONETIME ONE


   Stop: 12/20/20 01:28


   Last Admin: 12/20/20 01:43 Dose:  4 mg


   Documented by: 


Ondansetron HCl (Zofran)  4 mg IVPUSH ONETIME ONE


   Stop: 12/20/20 05:42


   Last Admin: 12/20/20 06:47 Dose:  4 mg


   Documented by: 


Pantoprazole Sodium (Protonix***)  40 mg PO DAILY [facility]


   Last Admin: 12/20/20 10:23 Dose:  Not Given


   Documented by: 


Potassium Chloride (Klor-Con M20)  40 meq PO Q4H [facility]


   Stop: 12/20/20 14:01


   Last Admin: 12/20/20 14:53 Dose:  40 meq


   Documented by: 











- Exam


General: Alert


Neck: Supple


Lungs: Clear to Auscultation


Cardiovascular: Regular Rate





Sepsis Event Note





- Evaluation


Sepsis Screening Result: No Definite Risk





- Focused Exam


Vital Signs: 


                                   Vital Signs











  Temp Pulse Resp BP Pulse Ox


 


 12/22/20 04:00  97.6 F  59 L  15  127/63  93 L


 


 12/22/20 00:00  97.4 F  73  15  121/77  93 L














- Problem List & Annotations


(1) Pneumonia


SNOMED Code(s): 163094073


   Code(s): J18.9 - PNEUMONIA, UNSPECIFIED ORGANISM   Status: Acute   Current 

Visit: Yes   


Qualifiers: 


   Pneumonia type: due to unspecified organism   Lung location: unspecified part

of lung 





(2) Hypokalemia


SNOMED Code(s): 23131361


   Code(s): E87.6 - HYPOKALEMIA   Status: Acute   Current Visit: Yes   





(3) Epigastric pain determined by examination


SNOMED Code(s): 816620962


   Code(s): R10.13 - EPIGASTRIC PAIN   Status: Acute   Current Visit: Yes   





(4) Acute renal insufficiency


SNOMED Code(s): 446544360


   Code(s): N28.9 - DISORDER OF KIDNEY AND URETER, UNSPECIFIED   Status: Acute  

Current Visit: Yes   





(5) IBS (irritable bowel syndrome)


SNOMED Code(s): 39866131


   Code(s): K58.9 - IRRITABLE BOWEL SYNDROME WITHOUT DIARRHEA   Status: Acute   

Current Visit: Yes   


Qualifiers: 


   Irritable bowel syndrome type: with diarrhea   Qualified Code(s): K58.0 - 

Irritable bowel syndrome with diarrhea   





(6) Headache


SNOMED Code(s): 83181400


   Code(s): R51.9 - HEADACHE, UNSPECIFIED   Status: Acute   Current Visit: Yes  




Qualifiers: 


   Intractability: not intractable 





- Problem List Review


Problem List Initiated/Reviewed/Updated: Yes





- My Orders


Last 24 Hours: 


My Active Orders





12/21/20 09:27


Isolation [COMM] Routine 





12/21/20 09:52


OT Evaluation and Treatment [CONS] Routine 


PT Evaluation and Treatment [CONS] Routine 





12/21/20 09:55


Ketorolac [Toradol]   15 mg IVPUSH Q6H PRN 





12/21/20 10:00


Thiamine [Vitamin B-1]   100 mg PO DAILY 





12/21/20 12:00


Loperamide [Imodium]   2 mg PO Q6H PRN 





12/21/20 21:06


Convert IV to Saline Lock [OM.PC] Routine 





12/22/20 09:30


levoFLOXacin [Levaquin]   750 mg PO Q24H 














- Plan


Plan:: 


Switch to oral abx. DC home tomorrow

## 2020-12-22 NOTE — CR
INDICATION:  Followup pneumonia.  



CHEST TWO VIEWS:  PA and lateral views of the chest 12/22/20 were compared with 
12/20/20 and again revealed infiltration bilaterally.  The infiltrates appear to
be slightly diminished suggesting early resolving pneumonia.  



A new acute process was not identified.  



MTDD

## 2020-12-23 RX ADMIN — VITAMIN D, TAB 1000IU (100/BT) SCH MCG: 25 TAB at 08:55

## 2020-12-23 NOTE — DISCH
DISCHARGE DATE:  12/23/2020

 

REASON FOR ADMISSION:

1. Community-acquired pneumonia.

2. History of alcohol abuse.

3. Irritable bowel syndrome.

4. Hypokalemia.

5. Epigastric pain.

 

DISCHARGE DIAGNOSES:

1. Community-acquired pneumonia.

2. History of alcohol abuse.

3. Irritable bowel syndrome.

4. Hypokalemia.

5. Epigastric pain.

 

BRIEF HISTORY:  This is a pleasant __________-year-old male, who was admitted

with epigastric pain, nausea and vomiting, was found to have bilateral

pneumonia.  COVID test was negative x2.  He was treated with IV Levaquin.  He

also got a PPI.  His symptoms improved markedly.  Blood cultures were negative.

He was discharged home on 23rd on Levaquin.  He also will go home with some

potassium replacement.  I have advised him to see his PCP within a week of

discharge.

 

I spent more than 35 minutes in the discharge of the patient.

 

Job#: [id number]

DD: 12/23/2020 0812

DT: 12/23/2020 0900 TN/DEANNA

## 2021-07-01 ENCOUNTER — HOSPITAL ENCOUNTER (EMERGENCY)
Dept: HOSPITAL 7 - FB.ED | Age: 71
Discharge: HOME | End: 2021-07-01
Payer: OTHER GOVERNMENT

## 2021-07-01 DIAGNOSIS — E86.0: ICD-10-CM

## 2021-07-01 DIAGNOSIS — N17.9: Primary | ICD-10-CM

## 2021-07-01 DIAGNOSIS — I10: ICD-10-CM

## 2021-07-01 DIAGNOSIS — K58.9: ICD-10-CM

## 2021-07-01 DIAGNOSIS — Z91.048: ICD-10-CM

## 2021-07-01 DIAGNOSIS — Z79.899: ICD-10-CM

## 2021-07-01 PROCEDURE — 99284 EMERGENCY DEPT VISIT MOD MDM: CPT

## 2021-07-01 PROCEDURE — 85025 COMPLETE CBC W/AUTO DIFF WBC: CPT

## 2021-07-01 PROCEDURE — 84484 ASSAY OF TROPONIN QUANT: CPT

## 2021-07-01 PROCEDURE — 80053 COMPREHEN METABOLIC PANEL: CPT

## 2021-07-01 PROCEDURE — 71045 X-RAY EXAM CHEST 1 VIEW: CPT

## 2021-07-01 PROCEDURE — 93005 ELECTROCARDIOGRAM TRACING: CPT

## 2021-07-01 PROCEDURE — 36415 COLL VENOUS BLD VENIPUNCTURE: CPT

## 2021-07-01 RX ADMIN — Medication PRN ML: at 16:08

## 2021-07-01 NOTE — CR
INDICATION: Follow-up pneumonia.



CHEST ONE VIEW:  AP upright portable view of the chest 07/01/21 was compared 
with 12/22/20 and 12/20/20.  



Infiltrate in the left mid lung field and lung base appears to have resolved 
with no definite active infiltrate or effusion at this time.  



The heart is normal in size and shape.  The aorta is minimally calcified in the 
arch and very minimally tortuous.  



IMPRESSION:  No acute process.

MTDD

## 2021-07-01 NOTE — EDM.PDOC
ED HPI GENERAL MEDICAL PROBLEM





- General


Chief Complaint: General


Stated Complaint: DIZZY


Time Seen by Provider: 07/01/21 17:00


Source of Information: Reports: Patient


History Limitations: Reports: No Limitations





- History of Present Illness


INITIAL COMMENTS - FREE TEXT/NARRATIVE: 





Patient presented to the ED because of dizziness for 3 months. He said he is 

lightheaded and dizzy upon standing. there is nausea but no vomiting. He has 

been having diarrhea for 1 month now due to his IBS and is taking imodium just 

once daily. He also c/o dry cough for 1 week but no fever or dyspnea.





- Related Data


                                    Allergies











Allergy/AdvReac Type Severity Reaction Status Date / Time


 


plastics Allergy  Rash Uncoded 12/20/20 03:14











Home Meds: 


                                    Home Meds





Cholecalciferol (Vitamin D3) [Vitamin D3] 50 mcg PO DAILY 12/20/20 [History]


Gabapentin [Neurontin] 400 mg PO BID 12/20/20 [History]


Losartan [Cozaar] 50 mg PO DAILY 12/20/20 [History]


Naproxen 500 mg PO BID PRN 12/20/20 [History]


Pantoprazole Sodium [Protonix] 40 mg PO DAILY 12/20/20 [History]


levoFLOXacin [Levaquin] 750 mg PO Q24H #7 tablet 12/23/20 [Rx]











Past Medical History


Cardiovascular History: Reports: Hypertension


Gastrointestinal History: Reports: Irritable Bowel Syndrome, Other (See Below)


Other Gastrointestinal History: History of ulcers.


Psychiatric History: Reports: Other (See Below)


Other Psychiatric History: Alcoholic





Social & Family History





- Family History


Family Medical History: No Pertinent Family History





- Tobacco Use


Tobacco Use Status *Q: Never Tobacco User





- Caffeine Use


Caffeine Use: Reports: Coffee


Other Caffeine Use: coffee 2x a week





- Alcohol Use


Number of Drinks Per Day: 3





- Recreational Drug Use


Recreational Drug Use: No





ED ROS GENERAL





- Review of Systems


Review Of Systems: See Below


Constitutional: Reports: No Symptoms


HEENT: Reports: No Symptoms


Respiratory: Reports: No Symptoms


Cardiovascular: Reports: No Symptoms


Endocrine: Reports: No Symptoms


GI/Abdominal: Reports: Diarrhea, Nausea, Vomiting


: Reports: No Symptoms


Musculoskeletal: Reports: No Symptoms


Skin: Reports: No Symptoms


Neurological: Reports: Dizziness


Psychiatric: Reports: No Symptoms


Hematologic/Lymphatic: Reports: No Symptoms





ED EXAM, GENERAL





- Physical Exam


Exam: See Below


Exam Limited By: No Limitations


General Appearance: Alert, No Apparent Distress


Eye Exam: Bilateral Eye: PERRL


Ears: Normal External Exam, Normal Canal


Nose: Normal Inspection, Normal Mucosa, No Blood


Throat/Mouth: Normal Inspection, Normal Lips, Normal Teeth


Head: Atraumatic, Normocephalic


Neck: Normal Inspection, Supple, Non-Tender, Full Range of Motion


Respiratory/Chest: No Respiratory Distress, Lungs Clear, Normal Breath Sounds


Cardiovascular: Normal Peripheral Pulses, Regular Rate, Rhythm, No Edema, No 

Gallop, No JVD, No Murmur, No Rub


GI/Abdominal: Soft, Non-Tender, No Organomegaly, No Distention, No Abnormal 

Bruit, No Mass, Other (hyperactive BS)


Back Exam: Normal Inspection, Full Range of Motion


Extremities: Normal Inspection, Normal Range of Motion, Non-Tender, No Pedal 

Edema, Normal Capillary Refill


Neurological: Alert, Oriented, CN II-XII Intact, Normal Cognition, Normal Gait, 

Normal Reflexes, No Motor/Sensory Deficits


Psychiatric: Normal Affect


  ** #1 Interpretation


EKG Date: 07/01/21


Time: 15:31


Rhythm: NSR


Rate (Beats/Min): 74


Axis: Normal


P-Wave: Present


QRS: Normal


ST-T: Normal


QT: Normal


MN/PQ Interval: 103


Comparison: NA - No Prior EKG


EKG Interpretation Comments: 





NSR


Short MN





Course





- Vital Signs


Text/Narrative:: 





Lab/EKG/CXR result was reviewed and discussed with patient and his wife


EKG -NSR with slight elevation in troponin most likely due to the elevated 

Creatinine and patient doesn't have any chset pain. The second troponin-slight 

decrease and patient still w/o chest pain. He was advised to follow up tomorrow 

or return to the ED anytime if he develops any chest pain.


NS 1 L @ 500ml/hr


Meclizine 25 mg PO x1


Last Recorded V/S: 


                                Last Vital Signs











Temp  36.8 C   07/01/21 16:56


 


Pulse  75   07/01/21 16:56


 


Resp  20   07/01/21 16:56


 


BP  96/50 L  07/01/21 16:56


 


Pulse Ox  98   07/01/21 16:56








                                        





Orthostatic Blood Pressure [     74/47


Standing]                        


Orthostatic Blood Pressure [     86/57


Sitting]                         


Orthostatic Blood Pressure [     95/55


Supine]                          











- Orders/Labs/Meds


Orders: 


                               Active Orders 24 hr











 Category Date Time Status


 


 Saline Lock Insert [OM.PC] Routine Oth  07/01/21 15:50 Ordered


 


 EKG 12 Lead [EK] Routine Ther  07/01/21 15:49 Ordered











Labs: 


                                Laboratory Tests











  07/01/21 07/01/21 07/01/21 Range/Units





  16:00 16:00 16:00 


 


WBC  6.0    (3.2-10.1)  x10-3/uL


 


RBC  4.81    (3.90-5.90)  x10(6)uL


 


Hgb  14.6    (12.9-17.7)  g/dL


 


Hct  43.3    (38.3-50.1)  %


 


MCV  90.1    (80.8-98.7)  fL


 


MCH  30.3    (27.0-33.3)  pg


 


MCHC  33.6    (28.7-35.3)  g/dL


 


RDW  13.3    (12.4-15.0)  %


 


Plt Count  192    (117-477)  x10(3)uL


 


MPV  8.4    (6.7-11.0)  fL


 


Neut % (Auto)  73.9 H    (40.3-71.8)  %


 


Lymph % (Auto)  14.4 L    (15.8-45.3)  %


 


Mono % (Auto)  10.9    (5.5-15.2)  %


 


Eos % (Auto)  0.5    (0.1-6.8)  %


 


Baso % (Auto)  0.3    (0.3-3.8)  %


 


Neut # (Auto)  4.4    (1.7-6.9)  x10-3/uL


 


Lymph # (Auto)  0.9    (0.5-4.5)  x10-3/uL


 


Mono # (Auto)  0.7    (0.0-1.2)  x10-3/uL


 


Eos # (Auto)  0.0    (0.0-0.6)  x10-3/uL


 


Baso # (Auto)  0.0    (0.0-0.3)  x10-3/uL


 


Sodium   140   (135-145)  mmol/L


 


Potassium   3.9   (3.5-5.3)  mmol/L


 


Chloride   104   (100-110)  mmol/L


 


Carbon Dioxide   21   (21-32)  mmol/L


 


BUN   21 H   (7-18)  mg/dL


 


Creatinine   1.6 H   (0.70-1.30)  mg/dL


 


Est Cr Clr Drug Dosing   TNP   


 


Estimated GFR (MDRD)   43 L   (>60)  


 


BUN/Creatinine Ratio   13.1   (9-20)  


 


Glucose   107   ()  mg/dL


 


Calcium   8.9   (8.6-10.2)  mg/dL


 


Total Bilirubin   0.7   (0.1-1.3)  mg/dL


 


AST   23  D   (5-25)  IU/L


 


ALT   27  D   (12-36)  U/L


 


Alkaline Phosphatase   79   ()  IU/L


 


Troponin I    67.7 H*  (4.0-60.3)  pg/mL


 


Total Protein   6.5   (6.0-8.0)  g/dL


 


Albumin   3.2   (3.2-4.6)  g/dL


 


Globulin   3.3   g/dL


 


Albumin/Globulin Ratio   1.0   














  07/01/21 Range/Units





  17:30 


 


WBC   (3.2-10.1)  x10-3/uL


 


RBC   (3.90-5.90)  x10(6)uL


 


Hgb   (12.9-17.7)  g/dL


 


Hct   (38.3-50.1)  %


 


MCV   (80.8-98.7)  fL


 


MCH   (27.0-33.3)  pg


 


MCHC   (28.7-35.3)  g/dL


 


RDW   (12.4-15.0)  %


 


Plt Count   (117-477)  x10(3)uL


 


MPV   (6.7-11.0)  fL


 


Neut % (Auto)   (40.3-71.8)  %


 


Lymph % (Auto)   (15.8-45.3)  %


 


Mono % (Auto)   (5.5-15.2)  %


 


Eos % (Auto)   (0.1-6.8)  %


 


Baso % (Auto)   (0.3-3.8)  %


 


Neut # (Auto)   (1.7-6.9)  x10-3/uL


 


Lymph # (Auto)   (0.5-4.5)  x10-3/uL


 


Mono # (Auto)   (0.0-1.2)  x10-3/uL


 


Eos # (Auto)   (0.0-0.6)  x10-3/uL


 


Baso # (Auto)   (0.0-0.3)  x10-3/uL


 


Sodium   (135-145)  mmol/L


 


Potassium   (3.5-5.3)  mmol/L


 


Chloride   (100-110)  mmol/L


 


Carbon Dioxide   (21-32)  mmol/L


 


BUN   (7-18)  mg/dL


 


Creatinine   (0.70-1.30)  mg/dL


 


Est Cr Clr Drug Dosing   


 


Estimated GFR (MDRD)   (>60)  


 


BUN/Creatinine Ratio   (9-20)  


 


Glucose   ()  mg/dL


 


Calcium   (8.6-10.2)  mg/dL


 


Total Bilirubin   (0.1-1.3)  mg/dL


 


AST   (5-25)  IU/L


 


ALT   (12-36)  U/L


 


Alkaline Phosphatase   ()  IU/L


 


Troponin I  67.4 H*  (4.0-60.3)  pg/mL


 


Total Protein   (6.0-8.0)  g/dL


 


Albumin   (3.2-4.6)  g/dL


 


Globulin   g/dL


 


Albumin/Globulin Ratio   











Meds: 


Medications














Discontinued Medications














Generic Name Dose Route Start Last Admin





  Trade Name Freq  PRN Reason Stop Dose Admin


 


Sodium Chloride  1,000 mls @ 500 mls/hr  07/01/21 16:00  07/01/21 16:10





  Normal Saline  IV   500 mls/hr





  ASDIRECTED MARIEL   Administration


 


Meclizine HCl  25 mg  07/01/21 15:51  07/01/21 16:15





  Meclizine 25 Mg Tab  PO  07/01/21 15:52  25 mg





  NOW STA   Administration


 


Sodium Chloride  10 ml  07/01/21 15:50  07/01/21 16:08





  Sodium Chloride 0.9% 10 Ml Syringe  FLUSH   10 ml





  ASDIRECTED PRN   Administration





  Keep Vein Open  














Departure





- Departure


Time of Disposition: 18:00


Disposition: Home, Self-Care 01


Condition: Good


Clinical Impression: 


 Dehydration, LUCERO (acute kidney injury), IBS (irritable bowel syndrome)








- Discharge Information


Instructions:  Acute Kidney Injury, Adult, Diet for Irritable Bowel Syndrome, 

Dehydration, Elderly, Easy-to-Read


Referrals: 


PCP,None [Primary Care Provider] - 


Forms:  ED Department Discharge


Additional Instructions: 


Please read discharge instructions on dehydration, acute kidney injury, IBS


Drink at least 2-3 liters of water daily


Take your imodium 1-2 tablets every 6 hours as needed for diarrhea


Follow up at [facility] and tell your doctor to have another stress test





Sepsis Event Note (ED)





- Evaluation


Sepsis Screening Result: No Definite Risk





- My Orders


Last 24 Hours: 


My Active Orders





07/01/21 15:49


EKG 12 Lead [EK] Routine 





07/01/21 15:50


Saline Lock Insert [OM.PC] Routine 














- Assessment/Plan


Last 24 Hours: 


My Active Orders





07/01/21 15:49


EKG 12 Lead [EK] Routine 





07/01/21 15:50


Saline Lock Insert [OM.PC] Routine

## 2023-07-18 ENCOUNTER — HOSPITAL ENCOUNTER (EMERGENCY)
Dept: HOSPITAL 7 - FB.ED | Age: 73
Discharge: HOME | End: 2023-07-18
Payer: OTHER GOVERNMENT

## 2023-07-18 DIAGNOSIS — I10: ICD-10-CM

## 2023-07-18 DIAGNOSIS — Z79.01: ICD-10-CM

## 2023-07-18 DIAGNOSIS — Z87.891: ICD-10-CM

## 2023-07-18 DIAGNOSIS — Z91.09: ICD-10-CM

## 2023-07-18 DIAGNOSIS — Z86.718: ICD-10-CM

## 2023-07-18 DIAGNOSIS — Z91.040: ICD-10-CM

## 2023-07-18 DIAGNOSIS — M51.36: Primary | ICD-10-CM

## 2023-07-18 LAB
APPEARANCE UR: CLEAR
BACTERIA URNS QL MICRO: (no result)
BILIRUB UR STRIP-MCNC: NEGATIVE MG/DL
COLOR UR: YELLOW
GLUCOSE UR STRIP-MCNC: NORMAL MG/DL
KETONES UR STRIP-MCNC: NEGATIVE MG/DL
MUCOUS THREADS URNS QL MICRO: (no result)
NITRITE UR QL: NEGATIVE
PH UR STRIP: 6 [PH] (ref 5–6.5)
PROT UR STRIP-MCNC: NEGATIVE MG/DL
RBC # URNS HPF: (no result) /ML (ref 0–5)
RBC UR QL: NEGATIVE
SP GR UR STRIP: 1.03 (ref 1.01–1.02)
SQUAMOUS #/AREA URNS HPF: (no result) /[HPF]
UROBILINOGEN UR STRIP-ACNC: NORMAL MG/DL
WBC UR QL: (no result) (ref 0–5)

## 2023-07-18 PROCEDURE — 96372 THER/PROPH/DIAG INJ SC/IM: CPT

## 2023-07-18 PROCEDURE — 74176 CT ABD & PELVIS W/O CONTRAST: CPT

## 2023-07-18 PROCEDURE — 81001 URINALYSIS AUTO W/SCOPE: CPT

## 2023-07-18 PROCEDURE — 99284 EMERGENCY DEPT VISIT MOD MDM: CPT

## 2023-07-18 RX ADMIN — ONDANSETRON ONE MG: 4 TABLET, ORALLY DISINTEGRATING ORAL at 20:20

## 2023-07-18 RX ADMIN — MORPHINE SULFATE ONE MG: 10 INJECTION, SOLUTION INTRAMUSCULAR; INTRAVENOUS at 20:19

## 2024-08-29 ENCOUNTER — HOSPITAL ENCOUNTER (EMERGENCY)
Dept: HOSPITAL 7 - FB.ED | Age: 74
Discharge: HOME | End: 2024-08-29
Payer: OTHER GOVERNMENT

## 2024-08-29 DIAGNOSIS — Z91.040: ICD-10-CM

## 2024-08-29 DIAGNOSIS — I10: ICD-10-CM

## 2024-08-29 DIAGNOSIS — R07.89: Primary | ICD-10-CM

## 2024-08-29 DIAGNOSIS — Z87.891: ICD-10-CM

## 2024-08-29 DIAGNOSIS — Z91.048: ICD-10-CM

## 2024-08-29 DIAGNOSIS — Z79.899: ICD-10-CM

## 2024-08-29 DIAGNOSIS — E83.42: ICD-10-CM

## 2024-08-29 DIAGNOSIS — E87.6: ICD-10-CM

## 2024-08-29 LAB
ALBUMIN SERPL-MCNC: 3.4 G/DL (ref 3.2–4.6)
ALBUMIN/GLOB SERPL: 1.1 {RATIO}
ALP SERPL-CCNC: 88 IU/L (ref 56–112)
ALT SERPL-CCNC: 17 U/L (ref 12–36)
AST SERPL-CCNC: 15 IU/L (ref 5–25)
BASE EXCESS BLDV CALC-SCNC: -2 MMOL/L
BASOPHILS # BLD AUTO: 0 X10-3/UL (ref 0–0.3)
BASOPHILS NFR BLD AUTO: 0.4 % (ref 0.3–3.8)
BILIRUB SERPL-MCNC: 0.8 MG/DL (ref 0.1–1.3)
BUN SERPL-MCNC: 11 MG/DL (ref 7–18)
BUN/CREAT SERPL: 9.2 (ref 9–20)
CALCIUM SERPL-MCNC: 8.5 MG/DL (ref 8.6–10.2)
CHLORIDE SERPL-SCNC: 104 MMOL/L (ref 100–110)
CO2 SERPL-SCNC: 24 MMOL/L (ref 21–32)
CREAT CL 24H UR+SERPL-VRATE: (no result) ML/MIN
CREAT SERPL-MCNC: 1.2 MG/DL (ref 0.7–1.3)
EOSINOPHIL # BLD AUTO: 0.1 X10-3/UL (ref 0–0.6)
EOSINOPHIL NFR BLD AUTO: 1.4 % (ref 0.1–6.8)
ERYTHROCYTE [DISTWIDTH] IN BLOOD BY AUTOMATED COUNT: 13.3 % (ref 12.4–15)
GLOBULIN SER-MCNC: 3.1 G/DL
GLUCOSE SERPL-MCNC: 168 MG/DL (ref 80–116)
HCT VFR BLD AUTO: 41 % (ref 38.3–50.1)
HGB BLD-MCNC: 13.9 G/DL (ref 12.9–17.7)
LYMPHOCYTES # BLD AUTO: 0.6 X10-3/UL (ref 0.5–4.5)
LYMPHOCYTES NFR BLD AUTO: 13 % (ref 15.8–45.3)
MAGNESIUM SERPL-MCNC: 1.4 MG/DL (ref 1.8–2.5)
MCH RBC QN AUTO: 31.9 PG (ref 27–33.3)
MCHC RBC AUTO-ENTMCNC: 33.8 G/DL (ref 28.7–35.3)
MCHC RBC AUTO-ENTMCNC: 94.5 FL (ref 80.8–98.7)
MONOCYTES # BLD AUTO: 0.4 X10-3/UL (ref 0–1.2)
MONOCYTES NFR BLD AUTO: 8.7 % (ref 5.5–15.2)
NEUTROPHILS # BLD AUTO: 3.4 X10-3/UL (ref 1.7–6.9)
NEUTROPHILS NFR BLD AUTO: 76.5 % (ref 40.3–71.8)
PCO2 BLDV: 31 MMHG (ref 41–51)
PH BLDV: 7.44 PH UNITS (ref 7.32–7.43)
PLATELET # BLD AUTO: 184 X10(3)UL (ref 117–477)
PMV BLD AUTO: 7.8 FL (ref 6.7–11)
POTASSIUM SERPL-SCNC: 3.4 MMOL/L (ref 3.5–5.3)
PROT SERPL-MCNC: 6.5 G/DL (ref 6–8)
RBC # BLD AUTO: 4.34 X10(6)UL (ref 3.9–5.9)
SODIUM SERPL-SCNC: 139 MMOL/L (ref 135–145)
WBC # BLD AUTO: 4.5 X10-3/UL (ref 3.2–10.1)

## 2024-08-29 PROCEDURE — 83735 ASSAY OF MAGNESIUM: CPT

## 2024-08-29 PROCEDURE — 96372 THER/PROPH/DIAG INJ SC/IM: CPT

## 2024-08-29 PROCEDURE — 83880 ASSAY OF NATRIURETIC PEPTIDE: CPT

## 2024-08-29 PROCEDURE — 71045 X-RAY EXAM CHEST 1 VIEW: CPT

## 2024-08-29 PROCEDURE — 85379 FIBRIN DEGRADATION QUANT: CPT

## 2024-08-29 PROCEDURE — 84484 ASSAY OF TROPONIN QUANT: CPT

## 2024-08-29 PROCEDURE — 85025 COMPLETE CBC W/AUTO DIFF WBC: CPT

## 2024-08-29 PROCEDURE — 80053 COMPREHEN METABOLIC PANEL: CPT

## 2024-08-29 PROCEDURE — 93005 ELECTROCARDIOGRAM TRACING: CPT

## 2024-08-29 PROCEDURE — 99285 EMERGENCY DEPT VISIT HI MDM: CPT

## 2024-08-29 PROCEDURE — 36415 COLL VENOUS BLD VENIPUNCTURE: CPT

## 2024-08-29 RX ADMIN — POTASSIUM CHLORIDE ONE MEQ: 1500 TABLET, EXTENDED RELEASE ORAL at 15:37

## 2024-08-29 RX ADMIN — KETOROLAC TROMETHAMINE ONE MG: 30 INJECTION, SOLUTION INTRAMUSCULAR at 15:37
